# Patient Record
Sex: MALE | Race: BLACK OR AFRICAN AMERICAN | Employment: FULL TIME | URBAN - METROPOLITAN AREA
[De-identification: names, ages, dates, MRNs, and addresses within clinical notes are randomized per-mention and may not be internally consistent; named-entity substitution may affect disease eponyms.]

---

## 2017-06-06 ENCOUNTER — OFFICE VISIT (OUTPATIENT)
Dept: FAMILY MEDICINE CLINIC | Age: 52
End: 2017-06-06

## 2017-06-06 VITALS
WEIGHT: 315 LBS | RESPIRATION RATE: 20 BRPM | HEIGHT: 73 IN | OXYGEN SATURATION: 97 % | SYSTOLIC BLOOD PRESSURE: 142 MMHG | HEART RATE: 80 BPM | BODY MASS INDEX: 41.75 KG/M2 | TEMPERATURE: 96.9 F | DIASTOLIC BLOOD PRESSURE: 99 MMHG

## 2017-06-06 DIAGNOSIS — I10 ESSENTIAL HYPERTENSION: Primary | ICD-10-CM

## 2017-06-06 DIAGNOSIS — G47.33 OSA (OBSTRUCTIVE SLEEP APNEA): ICD-10-CM

## 2017-06-06 DIAGNOSIS — R97.20 ELEVATED PSA: ICD-10-CM

## 2017-06-06 PROBLEM — E78.00 PURE HYPERCHOLESTEROLEMIA: Status: ACTIVE | Noted: 2017-06-06

## 2017-06-06 PROBLEM — E66.01 MORBID OBESITY (HCC): Status: ACTIVE | Noted: 2017-06-06

## 2017-06-06 PROBLEM — Z99.89 OSA ON CPAP: Status: ACTIVE | Noted: 2017-06-06

## 2017-06-06 RX ORDER — ATORVASTATIN CALCIUM 40 MG/1
TABLET, FILM COATED ORAL DAILY
COMMUNITY
End: 2018-11-15

## 2017-06-06 RX ORDER — MELATONIN
DAILY
COMMUNITY
End: 2017-06-06 | Stop reason: CLARIF

## 2017-06-06 RX ORDER — LISINOPRIL 10 MG/1
TABLET ORAL DAILY
COMMUNITY
End: 2017-06-06 | Stop reason: CLARIF

## 2017-06-06 RX ORDER — ATORVASTATIN CALCIUM 20 MG/1
TABLET, FILM COATED ORAL DAILY
COMMUNITY
End: 2017-06-06 | Stop reason: CLARIF

## 2017-06-06 RX ORDER — HYDROCHLOROTHIAZIDE 25 MG/1
25 TABLET ORAL DAILY
COMMUNITY
End: 2017-06-06 | Stop reason: CLARIF

## 2017-06-06 RX ORDER — LISINOPRIL AND HYDROCHLOROTHIAZIDE 20; 25 MG/1; MG/1
TABLET ORAL DAILY
COMMUNITY
End: 2018-11-15 | Stop reason: SDUPTHER

## 2017-06-06 RX ORDER — MELATONIN
DAILY
COMMUNITY
End: 2018-11-15

## 2017-06-06 NOTE — MR AVS SNAPSHOT
Visit Information Date & Time Provider Department Dept. Phone Encounter #  
 6/6/2017 10:00 AM [facility], [address] [phone] [id number] Follow-up Instructions Return in about 3 months (around 9/6/2017) for hypertension. Upcoming Health Maintenance Date Due DTaP/Tdap/Td series (1 - Tdap) 1/8/1987 FOBT Q 1 YEAR AGE 50-75 1/8/2016 INFLUENZA AGE 9 TO ADULT 8/1/2017 Allergies as of 6/6/2017  Review Complete On: 6/6/2017 By: Maddie Johnson LPN Severity Noted Reaction Type Reactions Pcn [Penicillins] High 06/06/2017   Systemic Hives, Swelling Current Immunizations  Never Reviewed No immunizations on file. Not reviewed this visit You Were Diagnosed With   
  
 Codes Comments Essential hypertension    -  Primary ICD-10-CM: I10 
ICD-9-CM: 401.9 Elevated PSA     ICD-10-CM: R97.20 ICD-9-CM: 790.93   
 YVES (obstructive sleep apnea)     ICD-10-CM: G47.33 
ICD-9-CM: 327.23 Vitals BP Pulse Temp Resp Height(growth percentile) Weight(growth percentile) (!) 142/99 80 96.9 °F (36.1 °C) (Oral) 20 6' 1.43\" (1.865 m) 347 lb 12.8 oz (157.8 kg) SpO2 BMI Smoking Status 97% 45.36 kg/m2 Former Smoker Vitals History BMI and BSA Data Body Mass Index Body Surface Area  
 45.36 kg/m 2 2.86 m 2 Your Updated Medication List  
  
   
This list is accurate as of: 6/6/17 10:21 AM.  Always use your most recent med list.  
  
  
  
  
 atorvastatin 20 mg tablet Commonly known as:  LIPITOR Take  by mouth daily. hydroCHLOROthiazide 25 mg tablet Commonly known as:  HYDRODIURIL Take 25 mg by mouth daily. lisinopril 10 mg tablet Commonly known as:  Alberto Bold Take  by mouth daily. VITAMIN D3 1,000 unit tablet Generic drug:  cholecalciferol Take  by mouth daily. Follow-up Instructions Return in about 3 months (around 9/6/2017) for hypertension. Patient Instructions Please limit salt in your diet. 1800 mg daily. Avoid shaking salt on your food at the lunch or dinner table. Try alternative seasonings such as Mrs Grace Garcia and natural herbs. Please engage in regular exercise. Moderate intensity aerobic exercise  (60-70% maximal heart rate) for at least 80 minutes per week spread over a minimum of 3 days. Do not take more than 2 days off from exercising. Activities such as brisk walking, jogging, bicycling and swimming. DASH Diet: Care Instructions Your Care Instructions The DASH diet is an eating plan that can help lower your blood pressure. DASH stands for Dietary Approaches to Stop Hypertension. Hypertension is high blood pressure. The DASH diet focuses on eating foods that are high in calcium, potassium, and magnesium. These nutrients can lower blood pressure. The foods that are highest in these nutrients are fruits, vegetables, low-fat dairy products, nuts, seeds, and legumes. But taking calcium, potassium, and magnesium supplements instead of eating foods that are high in those nutrients does not have the same effect. The DASH diet also includes whole grains, fish, and poultry. The DASH diet is one of several lifestyle changes your doctor may recommend to lower your high blood pressure. Your doctor may also want you to decrease the amount of sodium in your diet. Lowering sodium while following the DASH diet can lower blood pressure even further than just the DASH diet alone. Follow-up care is a key part of your treatment and safety. Be sure to make and go to all appointments, and call your doctor if you are having problems. It's also a good idea to know your test results and keep a list of the medicines you take. How can you care for yourself at home? Following the DASH diet · Eat 4 to 5 servings of fruit each day.  A serving is 1 medium-sized piece of fruit, ½ cup chopped or canned fruit, 1/4 cup dried fruit, or 4 ounces (½ cup) of fruit juice. Choose fruit more often than fruit juice. · Eat 4 to 5 servings of vegetables each day. A serving is 1 cup of lettuce or raw leafy vegetables, ½ cup of chopped or cooked vegetables, or 4 ounces (½ cup) of vegetable juice. Choose vegetables more often than vegetable juice. · Get 2 to 3 servings of low-fat and fat-free dairy each day. A serving is 8 ounces of milk, 1 cup of yogurt, or 1 ½ ounces of cheese. · Eat 6 to 8 servings of grains each day. A serving is 1 slice of bread, 1 ounce of dry cereal, or ½ cup of cooked rice, pasta, or cooked cereal. Try to choose whole-grain products as much as possible. · Limit lean meat, poultry, and fish to 2 servings each day. A serving is 3 ounces, about the size of a deck of cards. · Eat 4 to 5 servings of nuts, seeds, and legumes (cooked dried beans, lentils, and split peas) each week. A serving is 1/3 cup of nuts, 2 tablespoons of seeds, or ½ cup of cooked beans or peas. · Limit fats and oils to 2 to 3 servings each day. A serving is 1 teaspoon of vegetable oil or 2 tablespoons of salad dressing. · Limit sweets and added sugars to 5 servings or less a week. A serving is 1 tablespoon jelly or jam, ½ cup sorbet, or 1 cup of lemonade. · Eat less than 2,300 milligrams (mg) of sodium a day. If you limit your sodium to 1,500 mg a day, you can lower your blood pressure even more. Tips for success · Start small. Do not try to make dramatic changes to your diet all at once. You might feel that you are missing out on your favorite foods and then be more likely to not follow the plan. Make small changes, and stick with them. Once those changes become habit, add a few more changes. · Try some of the following: ¨ Make it a goal to eat a fruit or vegetable at every meal and at snacks. This will make it easy to get the recommended amount of fruits and vegetables each day. ¨ Try yogurt topped with fruit and nuts for a snack or healthy dessert. ¨ Add lettuce, tomato, cucumber, and onion to sandwiches. ¨ Combine a ready-made pizza crust with low-fat mozzarella cheese and lots of vegetable toppings. Try using tomatoes, squash, spinach, broccoli, carrots, cauliflower, and onions. ¨ Have a variety of cut-up vegetables with a low-fat dip as an appetizer instead of chips and dip. ¨ Sprinkle sunflower seeds or chopped almonds over salads. Or try adding chopped walnuts or almonds to cooked vegetables. ¨ Try some vegetarian meals using beans and peas. Add garbanzo or kidney beans to salads. Make burritos and tacos with mashed luevano beans or black beans. Where can you learn more? Go to http://angelyyetuyomaira.info/. Enter N666 in the search box to learn more about \"DASH Diet: Care Instructions. \" Current as of: March 23, 2016 Content Version: 11.2 © 7502-4252 [employer]. Care instructions adapted under license by [employer] (which disclaims liability or warranty for this information). If you have questions about a medical condition or this instruction, always ask your healthcare professional. [address] any warranty or liability for your use of this information. Introducing [facility]! Ayo Stuart introduces Hyp[employer]arks patient portal. Now you can access parts of your medical record, email your doctor's office, and request medication refills online. 1. In your internet browser, go to [email or web address] [email or web address] 2. Click on the First Time User? Click Here link in the Sign In box. You will see the New Member Sign Up page. 3. Enter your Hyp[employer]arks Access Code exactly as it appears below. You will not need to use this code after youve completed the sign-up process. If you do not sign up before the expiration date, you must request a new code.  
 
· [employer]arks Access Code: [email or web address] 
 Expires: 9/4/2017 10:21 AM 
 
4. Enter the last four digits of your Social Security Number (xxxx) and Date of Birth (mm/dd/yyyy) as indicated and click Submit. You will be taken to the next sign-up page. 5. Create a Tabber ID. This will be your Tabber login ID and cannot be changed, so think of one that is secure and easy to remember. 6. Create a Tabber password. You can change your password at any time. 7. Enter your Password Reset Question and Answer. This can be used at a later time if you forget your password. 8. Enter your e-mail address. You will receive e-mail notification when new information is available in [address] 9. Click Sign Up. You can now view and download portions of your medical record. 10. Click the Download Summary menu link to download a portable copy of your medical information. If you have questions, please visit the Frequently Asked Questions section of the Tabber website. Remember, Tabber is NOT to be used for urgent needs. For medical emergencies, dial 911. Now available from your iPhone and Android! Please provide this summary of care documentation to your next provider. Your primary care clinician is listed as [place]. If you have any questions after today's visit, please call [phone].

## 2017-06-06 NOTE — PROGRESS NOTES
Abi Nugent is a 47 y.o. male in today to establish care and referral to urology. Learning assessment completed; primary language is [place].

## 2017-06-06 NOTE — PATIENT INSTRUCTIONS
Please limit salt in your diet. 1800 mg daily. Avoid shaking salt on your food at the lunch or dinner table. Try alternative seasonings such as Mrs Dotty Castro and natural herbs. Please engage in regular exercise. Moderate intensity aerobic exercise  (60-70% maximal heart rate) for at least 80 minutes per week spread over a minimum of 3 days. Do not take more than 2 days off from exercising. Activities such as brisk walking, jogging, bicycling and swimming. DASH Diet: Care Instructions  Your Care Instructions  The DASH diet is an eating plan that can help lower your blood pressure. DASH stands for Dietary Approaches to Stop Hypertension. Hypertension is high blood pressure. The DASH diet focuses on eating foods that are high in calcium, potassium, and magnesium. These nutrients can lower blood pressure. The foods that are highest in these nutrients are fruits, vegetables, low-fat dairy products, nuts, seeds, and legumes. But taking calcium, potassium, and magnesium supplements instead of eating foods that are high in those nutrients does not have the same effect. The DASH diet also includes whole grains, fish, and poultry. The DASH diet is one of several lifestyle changes your doctor may recommend to lower your high blood pressure. Your doctor may also want you to decrease the amount of sodium in your diet. Lowering sodium while following the DASH diet can lower blood pressure even further than just the DASH diet alone. Follow-up care is a key part of your treatment and safety. Be sure to make and go to all appointments, and call your doctor if you are having problems. It's also a good idea to know your test results and keep a list of the medicines you take. How can you care for yourself at home? Following the DASH diet  · Eat 4 to 5 servings of fruit each day. A serving is 1 medium-sized piece of fruit, ½ cup chopped or canned fruit, 1/4 cup dried fruit, or 4 ounces (½ cup) of fruit juice.  Choose fruit more often than fruit juice. · Eat 4 to 5 servings of vegetables each day. A serving is 1 cup of lettuce or raw leafy vegetables, ½ cup of chopped or cooked vegetables, or 4 ounces (½ cup) of vegetable juice. Choose vegetables more often than vegetable juice. · Get 2 to 3 servings of low-fat and fat-free dairy each day. A serving is 8 ounces of milk, 1 cup of yogurt, or 1 ½ ounces of cheese. · Eat 6 to 8 servings of grains each day. A serving is 1 slice of bread, 1 ounce of dry cereal, or ½ cup of cooked rice, pasta, or cooked cereal. Try to choose whole-grain products as much as possible. · Limit lean meat, poultry, and fish to 2 servings each day. A serving is 3 ounces, about the size of a deck of cards. · Eat 4 to 5 servings of nuts, seeds, and legumes (cooked dried beans, lentils, and split peas) each week. A serving is 1/3 cup of nuts, 2 tablespoons of seeds, or ½ cup of cooked beans or peas. · Limit fats and oils to 2 to 3 servings each day. A serving is 1 teaspoon of vegetable oil or 2 tablespoons of salad dressing. · Limit sweets and added sugars to 5 servings or less a week. A serving is 1 tablespoon jelly or jam, ½ cup sorbet, or 1 cup of lemonade. · Eat less than 2,300 milligrams (mg) of sodium a day. If you limit your sodium to 1,500 mg a day, you can lower your blood pressure even more. Tips for success  · Start small. Do not try to make dramatic changes to your diet all at once. You might feel that you are missing out on your favorite foods and then be more likely to not follow the plan. Make small changes, and stick with them. Once those changes become habit, add a few more changes. · Try some of the following:  ¨ Make it a goal to eat a fruit or vegetable at every meal and at snacks. This will make it easy to get the recommended amount of fruits and vegetables each day. ¨ Try yogurt topped with fruit and nuts for a snack or healthy dessert.   ¨ Add lettuce, tomato, cucumber, and onion to sandwiches. ¨ Combine a ready-made pizza crust with low-fat mozzarella cheese and lots of vegetable toppings. Try using tomatoes, squash, spinach, broccoli, carrots, cauliflower, and onions. ¨ Have a variety of cut-up vegetables with a low-fat dip as an appetizer instead of chips and dip. ¨ Sprinkle sunflower seeds or chopped almonds over salads. Or try adding chopped walnuts or almonds to cooked vegetables. ¨ Try some vegetarian meals using beans and peas. Add garbanzo or kidney beans to salads. Make burritos and tacos with mashed luevano beans or black beans. Where can you learn more? Go to http://angely-yomaira.info/. Enter T723 in the search box to learn more about \"DASH Diet: Care Instructions. \"  Current as of: March 23, 2016  Content Version: 11.2  © 3475-4337 [employer], [employer]. Care instructions adapted under license by [employer] (which disclaims liability or warranty for this information). If you have questions about a medical condition or this instruction, always ask your healthcare professional. [place] [address] any warranty or liability for your use of this information.

## 2017-06-06 NOTE — PROGRESS NOTES
Establish Care and Referral Request (urology)        HPI: Jerson Olsen is a 47 y.o. male BLACK OR   Here to establish care. Hx of HTN, Hyperlipidemia, YVES. He reports he has hx of elevated PSA x 2 readings thru the [facility]. Quit smoking 3 months ago. Smoked for 10 years starting at age 26. Quit for 5 years then restarted. Total number of years approximately 28 years. Smoked about a Pack per day. Hx of YVES, HTN, and Hyperlipidemia diagnosed in approximately 1990. Past Medical History:   Diagnosis Date    Childhood asthma     CPAP (continuous positive airway pressure) dependence     HTN (hypertension)     Hypercholesteremia     Vitamin D deficiency        Current Outpatient Prescriptions   Medication Sig    atorvastatin (LIPITOR) 40 mg tablet Take  by mouth daily.  lisinopril-hydroCHLOROthiazide (PRINZIDE, ZESTORETIC) 20-25 mg per tablet Take  by mouth daily.  cholecalciferol (VITAMIN D3) 1,000 unit tablet Take  by mouth daily. No current facility-administered medications for this visit. Allergies   Allergen Reactions    Pcn [Penicillins] Hives and Swelling       Past Surgical History:   Procedure Laterality Date    HX COLONOSCOPY  04/2017       Family History   Problem Relation Age of Onset    Arthritis-osteo Mother     Hypertension Mother     Lung Disease Father     Alcohol abuse Brother     Prostate Cancer Brother     Cancer Maternal Grandfather     Lung Disease Maternal Grandfather        Social History     Social History    Marital status:      Spouse name: N/A    Number of children: N/A    Years of education: N/A     Occupational History    Not on file.      Social History Main Topics    Smoking status: Former Smoker     Types: Cigarettes     Quit date: 3/14/2017    Smokeless tobacco: Never Used    Alcohol use 1.2 oz/week     2 Shots of liquor per week    Drug use: No    Sexual activity: Not on file     Other Topics Concern    Not on file     Social History Narrative    No narrative on file       Gen- No weight loss, No Malaise, No fatigue  Eyes- No dipoplia, blurry vision  CVS- No Chest pain, no palpitations, no edema  Resp- No Cough, SOB, ROBB, Wheezing  Neuro- No headaches  Skin- No easy bruising, No rashes      Visit Vitals    BP (!) 142/99    Pulse 80    Temp 96.9 °F (36.1 °C) (Oral)    Resp 20    Ht 6' 1.43\" (1.865 m)    Wt 347 lb 12.8 oz (157.8 kg)    SpO2 97%    BMI 45.36 kg/m2       GEN- NAD, AAOx3  NECK- No carotid bruits. CVS- RRR, +S1, +S2, No Murmurs, No JVD  PULM- CTABL, No W/R/R  EXT- No edema  NEURO-Normal Gait  PSYCH- Euthymic, normal afffect  Declined prostate exam as he reports he had one in March that was normal.      Assesment:  1. Essential hypertension  Poorly controlled. He has been off of his HCTZ for a few days as he reports it causes him cramps    2. Elevated PSA  Will have patient provide us recent PSA values and referral be sent as needed. 3. YVES (obstructive sleep apnea)  Has not had evaluation in several years. would like to wait on referral as of now until workup on elevated PSA is complete. He is compliant with his CPAP. Denies any daytime fatigue/somolence        I have discussed the diagnosis with the patient and the intended management  The patient has received an after-visit summary and questions were answered concerning future plans. I have discussed medication usage, side effects and warnings with the patient as well. I have reviewed the plan of care with the patient, accepted their input and they are in agreement with the treatment goals. Follow-up Disposition:  Return in about 3 months (around 9/6/2017) for hypertension.       Aaron Pedraza MD                .

## 2017-07-07 PROBLEM — Z80.42 FAMILY HISTORY OF PROSTATE CANCER: Status: ACTIVE | Noted: 2017-07-07

## 2017-07-07 PROBLEM — R97.20 ELEVATED PSA: Status: ACTIVE | Noted: 2017-07-07

## 2018-11-15 ENCOUNTER — HOSPITAL ENCOUNTER (OUTPATIENT)
Dept: LAB | Age: 53
Discharge: HOME | End: 2018-11-15
Payer: COMMERCIAL

## 2018-11-15 ENCOUNTER — OFFICE VISIT (OUTPATIENT)
Dept: FAMILY MEDICINE CLINIC | Age: 53
End: 2018-11-15

## 2018-11-15 VITALS
OXYGEN SATURATION: 97 % | HEIGHT: 73 IN | TEMPERATURE: 99.1 F | DIASTOLIC BLOOD PRESSURE: 111 MMHG | RESPIRATION RATE: 18 BRPM | WEIGHT: 315 LBS | HEART RATE: 71 BPM | SYSTOLIC BLOOD PRESSURE: 155 MMHG | BODY MASS INDEX: 41.75 KG/M2

## 2018-11-15 DIAGNOSIS — C61 PROSTATE CANCER (HCC): ICD-10-CM

## 2018-11-15 DIAGNOSIS — I10 ESSENTIAL HYPERTENSION: Primary | ICD-10-CM

## 2018-11-15 DIAGNOSIS — Z23 ENCOUNTER FOR IMMUNIZATION: ICD-10-CM

## 2018-11-15 DIAGNOSIS — I10 ESSENTIAL HYPERTENSION: ICD-10-CM

## 2018-11-15 DIAGNOSIS — J31.0 CHRONIC RHINITIS: ICD-10-CM

## 2018-11-15 LAB
ALBUMIN SERPL-MCNC: 4.3 G/DL (ref 3.4–5)
ALBUMIN/GLOB SERPL: 1.2 {RATIO} (ref 0.8–1.7)
ALP SERPL-CCNC: 68 U/L (ref 45–117)
ALT SERPL-CCNC: 32 U/L (ref 16–61)
ANION GAP SERPL CALC-SCNC: 10 MMOL/L (ref 3–18)
AST SERPL-CCNC: 15 U/L (ref 15–37)
BILIRUB SERPL-MCNC: 0.6 MG/DL (ref 0.2–1)
BUN SERPL-MCNC: 19 MG/DL (ref 7–18)
BUN/CREAT SERPL: 20 (ref 12–20)
CALCIUM SERPL-MCNC: 9.6 MG/DL (ref 8.5–10.1)
CHLORIDE SERPL-SCNC: 105 MMOL/L (ref 100–108)
CHOLEST SERPL-MCNC: 226 MG/DL
CO2 SERPL-SCNC: 27 MMOL/L (ref 21–32)
CREAT SERPL-MCNC: 0.97 MG/DL (ref 0.6–1.3)
GLOBULIN SER CALC-MCNC: 3.5 G/DL (ref 2–4)
GLUCOSE SERPL-MCNC: 94 MG/DL (ref 74–99)
HDLC SERPL-MCNC: 43 MG/DL (ref 40–60)
HDLC SERPL: 5.3 {RATIO} (ref 0–5)
LDLC SERPL CALC-MCNC: 164.6 MG/DL (ref 0–100)
LIPID PROFILE,FLP: ABNORMAL
POTASSIUM SERPL-SCNC: 3.9 MMOL/L (ref 3.5–5.5)
PROT SERPL-MCNC: 7.8 G/DL (ref 6.4–8.2)
PSA SERPL-MCNC: 0.9 NG/ML (ref 0–4)
SODIUM SERPL-SCNC: 142 MMOL/L (ref 136–145)
TRIGL SERPL-MCNC: 92 MG/DL (ref ?–150)
VLDLC SERPL CALC-MCNC: 18.4 MG/DL

## 2018-11-15 PROCEDURE — 82570 ASSAY OF URINE CREATININE: CPT

## 2018-11-15 PROCEDURE — 80061 LIPID PANEL: CPT

## 2018-11-15 PROCEDURE — 80053 COMPREHEN METABOLIC PANEL: CPT

## 2018-11-15 PROCEDURE — 84153 ASSAY OF PSA TOTAL: CPT

## 2018-11-15 PROCEDURE — 82652 VIT D 1 25-DIHYDROXY: CPT

## 2018-11-15 RX ORDER — MONTELUKAST SODIUM 10 MG/1
10 TABLET ORAL DAILY
Qty: 30 TAB | Refills: 2 | Status: SHIPPED | OUTPATIENT
Start: 2018-11-15 | End: 2019-04-25 | Stop reason: SDUPTHER

## 2018-11-15 RX ORDER — LISINOPRIL AND HYDROCHLOROTHIAZIDE 20; 25 MG/1; MG/1
1 TABLET ORAL DAILY
Qty: 90 TAB | Refills: 1 | Status: SHIPPED | OUTPATIENT
Start: 2018-11-15 | End: 2019-08-29 | Stop reason: SDUPTHER

## 2018-11-15 RX ORDER — MOMETASONE FUROATE 50 UG/1
2 SPRAY, METERED NASAL DAILY
Qty: 1 CONTAINER | Refills: 1 | Status: SHIPPED | OUTPATIENT
Start: 2018-11-15 | End: 2019-01-16 | Stop reason: SDUPTHER

## 2018-11-15 RX ORDER — LISINOPRIL AND HYDROCHLOROTHIAZIDE 12.5; 2 MG/1; MG/1
TABLET ORAL DAILY
COMMUNITY
End: 2018-11-15 | Stop reason: DRUGHIGH

## 2018-11-15 RX ORDER — CODEINE PHOSPHATE AND GUAIFENESIN 10; 100 MG/5ML; MG/5ML
5 SOLUTION ORAL
COMMUNITY
End: 2018-12-04

## 2018-11-15 NOTE — PROGRESS NOTES
Follow-up HPI: Gely Melendez is a 47 y.o. male [address] here after prolonged absence to reestablish care. He has been going to the [facility] for care prior to today. He is grown frustrated with care that he has received there. Last I saw him subsequent to that he was diagnosed with prostate cancer. He has been seeing Dr. Satish Angelo a [facility] of [place]. He has undergone brachytherapy as well as external radiation beam therapy. And it sounds like he has been given Eligard injection 1 month ago by his urologist at the [facility]. He would like to reestablish with Dr. Satish Angelo. Reports chronic nasal congestion, clear runny nose, mild sinus pressure for the last several months since radiation therapy. He denies any fevers, purulent nasal drainage, sore throat. He never had this problem prior to radiation therapy. He has been using Flonase with some mild improvement. However his symptoms persist. 
He was off of his blood pressure medications for several weeks due to difficulty with getting this refilled by his [facility] PCP. He just restarted this yesterday after seeing another local physician. Unfortunately the dosing was then corrected is 20/12.51 in fact he has been on 20/25 of Prinzide. Past Medical History:  
Diagnosis Date  Childhood asthma  CPAP (continuous positive airway pressure) dependence  HTN (hypertension)  Hypercholesteremia  Prostate cancer ([facility] [address]  Vitamin D deficiency Current Outpatient Medications Medication Sig  AZITHROMYCIN PO Take  by mouth.  PREDNISONE PO Take  by mouth.  guaiFENesin-codeine (VIRTUSSIN AC) 100-10 mg/5 mL solution Take 5 mL by mouth three (3) times daily as needed for Cough.  lisinopril-hydroCHLOROthiazide (PRINZIDE, ZESTORETIC) 20-25 mg per tablet Take 1 Tab by mouth daily.   
 mometasone (NASONEX) 50 mcg/actuation nasal spray 2 Sprays by Both Nostrils route daily.  montelukast (SINGULAIR) 10 mg tablet Take 1 Tab by mouth daily.  traMADol (ULTRAM-ER) 100 mg tablet Take 100 mg by mouth daily.  atorvastatin (LIPITOR) 40 mg tablet Take  by mouth daily.  cholecalciferol (VITAMIN D3) 1,000 unit tablet Take  by mouth daily. No current facility-administered medications for this visit. Allergies Allergen Reactions  Pcn [Penicillins] Hives and Swelling Past Surgical History:  
Procedure Laterality Date  HX COLONOSCOPY  2017 Family History Problem Relation Age of Onset Hollie Arthritis-osteo Mother  Hypertension Mother  Lung Disease Father  Alcohol abuse Brother  Prostate Cancer Brother  Cancer Maternal Grandfather  Lung Disease Maternal Grandfather Social History Socioeconomic History  Marital status:  Spouse name: Not on file  Number of children: Not on file  Years of education: Not on file  Highest education level: Not on file Social Needs  Financial resource strain: Not on file  Food insecurity - worry: Not on file  Food insecurity - inability: Not on file  Transportation needs - medical: Not on file  Transportation needs - non-medical: Not on file Occupational History  Not on file Tobacco Use  Smoking status: Former Smoker Types: Cigarettes Last attempt to quit: 3/14/2017 Years since quittin.6  Smokeless tobacco: Never Used Substance and Sexual Activity  Alcohol use: Yes Alcohol/week: 1.2 oz Types: 2 Shots of liquor per week  Drug use: No  
 Sexual activity: Not on file Other Topics Concern  Not on file Social History Narrative  Not on file Gen- No weight loss, No Malaise, No fatigue Eyes- No dipoplia, blurry vision CVS- No Chest pain, no palpitations, no edema Resp- No Cough, SOB, ROBB, Wheezing Neuro- No headaches Skin- No easy bruising, No rashes Visit Vitals BP (!) 155/111 (BP 1 Location: Left arm, BP Patient Position: Sitting) Pulse 71 Temp 99.1 °F (37.3 °C) Resp 18 Ht 6' 1\" (1.854 m) Wt (!) 358 lb (162.4 kg) SpO2 97% BMI 47.23 kg/m² GEN- NAD, AAOx3, morbidly obese ENT-external canals and TMs normal bilaterally, nose markedly congested bilaterally with clear nasal drainage; throat with marked postnasal drip mild erythema otherwise normal; negative sinus tenderness CVS- RRR, +S1, +S2, No Murmurs, No JVD PULM- CTABL, No W/R/R 
EXT- No edema NEURO-Normal Gait PSYCH- Euthymic, normal afffect Assesment: 1. Essential hypertension Blood pressure is uncontrolled today. Patient has just restarted blood pressure medicines yesterday. I have refilled his medicine that is previous dosing of 20/25 mg of Prinzide once daily. Will also need updated labs in regards to renal function as well as lipid panel. 
- lisinopril-hydroCHLOROthiazide (PRINZIDE, ZESTORETIC) 20-25 mg per tablet; Take 1 Tab by mouth daily. Dispense: 90 Tab; Refill: 1 
- METABOLIC PANEL, COMPREHENSIVE; Future - LIPID PANEL; Future - MICROALBUMIN, UR, RAND W/ MICROALB/CREAT RATIO; Future - INFLUENZA VIRUS VAC QUAD,SPLIT,PRESV FREE SYRINGE IM 2. Prostate cancer ([facility] [address] I have obtained a PSA as well as a vitamin D per Dr. Ileana Barnes is last chart note which I reviewed. I have also referred him back to Dr. Ileana Barnes for continued management of prostate cancer. 
- PSA, DIAGNOSTIC (PROSTATE SPECIFIC AG); Future - VITAMIN D, 1, 25 DIHYDROXY; Future 
- REFERRAL TO UROLOGY 3. Chronic rhinitis For his chronic rhinitis I have provided him a prescription for Nasonex as well as started him on Singulair once daily. - mometasone (NASONEX) 50 mcg/actuation nasal spray; 2 Sprays by Both Nostrils route daily. Dispense: 1 Container; Refill: 1 
- montelukast (SINGULAIR) 10 mg tablet; Take 1 Tab by mouth daily. Dispense: 30 Tab; Refill: 2 Patient was given flu shot today. I have discussed the diagnosis with the patient and the intended management  The patient has received an after-visit summary and questions were answered concerning future plans. I have discussed medication usage, side effects and warnings with the patient as well. I have reviewed the plan of care with the patient, accepted their input and they are in agreement with the treatment goals. Follow-up Disposition: 
Return in about 3 months (around 2/15/2019) for Hypertension.  
 
 
Tammie Mcdaniel MD 
 
 
 
 
 
 
 
.

## 2018-11-15 NOTE — PATIENT INSTRUCTIONS
DASH Diet: Care Instructions Your Care Instructions The DASH diet is an eating plan that can help lower your blood pressure. DASH stands for Dietary Approaches to Stop Hypertension. Hypertension is high blood pressure. The DASH diet focuses on eating foods that are high in calcium, potassium, and magnesium. These nutrients can lower blood pressure. The foods that are highest in these nutrients are fruits, vegetables, low-fat dairy products, nuts, seeds, and legumes. But taking calcium, potassium, and magnesium supplements instead of eating foods that are high in those nutrients does not have the same effect. The DASH diet also includes whole grains, fish, and poultry. The DASH diet is one of several lifestyle changes your doctor may recommend to lower your high blood pressure. Your doctor may also want you to decrease the amount of sodium in your diet. Lowering sodium while following the DASH diet can lower blood pressure even further than just the DASH diet alone. Follow-up care is a key part of your treatment and safety. Be sure to make and go to all appointments, and call your doctor if you are having problems. It's also a good idea to know your test results and keep a list of the medicines you take. How can you care for yourself at home? Following the DASH diet · Eat 4 to 5 servings of fruit each day. A serving is 1 medium-sized piece of fruit, ½ cup chopped or canned fruit, 1/4 cup dried fruit, or 4 ounces (½ cup) of fruit juice. Choose fruit more often than fruit juice. · Eat 4 to 5 servings of vegetables each day. A serving is 1 cup of lettuce or raw leafy vegetables, ½ cup of chopped or cooked vegetables, or 4 ounces (½ cup) of vegetable juice. Choose vegetables more often than vegetable juice. · Get 2 to 3 servings of low-fat and fat-free dairy each day. A serving is 8 ounces of milk, 1 cup of yogurt, or 1 ½ ounces of cheese. · Eat 6 to 8 servings of grains each day. A serving is 1 slice of bread, 1 ounce of dry cereal, or ½ cup of cooked rice, pasta, or cooked cereal. Try to choose whole-grain products as much as possible. · Limit lean meat, poultry, and fish to 2 servings each day. A serving is 3 ounces, about the size of a deck of cards. · Eat 4 to 5 servings of nuts, seeds, and legumes (cooked dried beans, lentils, and split peas) each week. A serving is 1/3 cup of nuts, 2 tablespoons of seeds, or ½ cup of cooked beans or peas. · Limit fats and oils to 2 to 3 servings each day. A serving is 1 teaspoon of vegetable oil or 2 tablespoons of salad dressing. · Limit sweets and added sugars to 5 servings or less a week. A serving is 1 tablespoon jelly or jam, ½ cup sorbet, or 1 cup of lemonade. · Eat less than 2,300 milligrams (mg) of sodium a day. If you limit your sodium to 1,500 mg a day, you can lower your blood pressure even more. Tips for success · Start small. Do not try to make dramatic changes to your diet all at once. You might feel that you are missing out on your favorite foods and then be more likely to not follow the plan. Make small changes, and stick with them. Once those changes become habit, add a few more changes. · Try some of the following: ? Make it a goal to eat a fruit or vegetable at every meal and at snacks. This will make it easy to get the recommended amount of fruits and vegetables each day. ? Try yogurt topped with fruit and nuts for a snack or healthy dessert. ? Add lettuce, tomato, cucumber, and onion to sandwiches. ? Combine a ready-made pizza crust with low-fat mozzarella cheese and lots of vegetable toppings. Try using tomatoes, squash, spinach, broccoli, carrots, cauliflower, and onions. ? Have a variety of cut-up vegetables with a low-fat dip as an appetizer instead of chips and dip. ? Sprinkle sunflower seeds or chopped almonds over salads.  Or try adding chopped walnuts or almonds to cooked vegetables. ? Try some vegetarian meals using beans and peas. Add garbanzo or kidney beans to salads. Make burritos and tacos with mashed luevano beans or black beans. Where can you learn more? Go to http://angely-yomaira.info/. Enter D845 in the search box to learn more about \"DASH Diet: Care Instructions. \" Current as of: December 6, 2017 Content Version: 11.8 © 8140-6872 [employer]. Care instructions adapted under license by [employer] (which disclaims liability or warranty for this information). If you have questions about a medical condition or this instruction, always ask your healthcare professional. [address] any warranty or liability for your use of this information.

## 2018-11-16 LAB
CREAT UR-MCNC: 136 MG/DL (ref 30–125)
MICROALBUMIN UR-MCNC: 0.62 MG/DL (ref 0–3)
MICROALBUMIN/CREAT UR-RTO: 5 MG/G (ref 0–30)

## 2018-11-19 LAB — 1,25(OH)2D3 SERPL-MCNC: 53.8 PG/ML (ref 19.9–79.3)

## 2018-11-19 NOTE — PROGRESS NOTES
Spoke with patient and made him aware of results. He states he will call back to make an appointment to discuss his cholesterol. Labs faxed to Dr. Niki Lacey at [facility].

## 2018-12-04 ENCOUNTER — OFFICE VISIT (OUTPATIENT)
Dept: FAMILY MEDICINE CLINIC | Age: 53
End: 2018-12-04

## 2018-12-04 VITALS
OXYGEN SATURATION: 95 % | BODY MASS INDEX: 41.75 KG/M2 | TEMPERATURE: 98.2 F | SYSTOLIC BLOOD PRESSURE: 134 MMHG | RESPIRATION RATE: 18 BRPM | HEIGHT: 73 IN | DIASTOLIC BLOOD PRESSURE: 94 MMHG | HEART RATE: 98 BPM | WEIGHT: 315 LBS

## 2018-12-04 DIAGNOSIS — E78.00 PURE HYPERCHOLESTEROLEMIA: ICD-10-CM

## 2018-12-04 DIAGNOSIS — I10 ESSENTIAL HYPERTENSION: ICD-10-CM

## 2018-12-04 DIAGNOSIS — J20.9 ACUTE BRONCHITIS, UNSPECIFIED ORGANISM: Primary | ICD-10-CM

## 2018-12-04 RX ORDER — ATORVASTATIN CALCIUM 10 MG/1
10 TABLET, FILM COATED ORAL DAILY
Qty: 90 TAB | Refills: 1 | Status: SHIPPED | OUTPATIENT
Start: 2018-12-04 | End: 2019-02-06 | Stop reason: SINTOL

## 2018-12-04 RX ORDER — AZITHROMYCIN 250 MG/1
TABLET, FILM COATED ORAL
Qty: 6 TAB | Refills: 0 | Status: SHIPPED | OUTPATIENT
Start: 2018-12-04 | End: 2018-12-08

## 2018-12-04 RX ORDER — CODEINE PHOSPHATE AND GUAIFENESIN 10; 100 MG/5ML; MG/5ML
5 SOLUTION ORAL
Qty: 90 ML | Refills: 0 | Status: SHIPPED | OUTPATIENT
Start: 2018-12-04 | End: 2019-02-06 | Stop reason: ALTCHOICE

## 2018-12-04 RX ORDER — PREDNISONE 20 MG/1
40 TABLET ORAL
Qty: 10 TAB | Refills: 0 | Status: SHIPPED | OUTPATIENT
Start: 2018-12-04 | End: 2019-02-06 | Stop reason: ALTCHOICE

## 2018-12-04 RX ORDER — ALBUTEROL SULFATE 2.5 MG/.5ML
SOLUTION RESPIRATORY (INHALATION) ONCE
COMMUNITY
End: 2019-01-11 | Stop reason: SDUPTHER

## 2018-12-04 NOTE — PATIENT INSTRUCTIONS
Bronchitis: Care Instructions  Your Care Instructions    Bronchitis is inflammation of the bronchial tubes, which carry air to the lungs. The tubes swell and produce mucus, or phlegm. The mucus and inflamed bronchial tubes make you cough. You may have trouble breathing. Most cases of bronchitis are caused by viruses like those that cause colds. Antibiotics usually do not help and they may be harmful. Bronchitis usually develops rapidly and lasts about 2 to 3 weeks in otherwise healthy people. Follow-up care is a key part of your treatment and safety. Be sure to make and go to all appointments, and call your doctor if you are having problems. It's also a good idea to know your test results and keep a list of the medicines you take. How can you care for yourself at home? · Take all medicines exactly as prescribed. Call your doctor if you think you are having a problem with your medicine. · Get some extra rest.  · Take an over-the-counter pain medicine, such as acetaminophen (Tylenol), ibuprofen (Advil, Motrin), or naproxen (Aleve) to reduce fever and relieve body aches. Read and follow all instructions on the label. · Do not take two or more pain medicines at the same time unless the doctor told you to. Many pain medicines have acetaminophen, which is Tylenol. Too much acetaminophen (Tylenol) can be harmful. · Take an over-the-counter cough medicine that contains dextromethorphan to help quiet a dry, hacking cough so that you can sleep. Avoid cough medicines that have more than one active ingredient. Read and follow all instructions on the label. · Breathe moist air from a humidifier, hot shower, or sink filled with hot water. The heat and moisture will thin mucus so you can cough it out. · Do not smoke. Smoking can make bronchitis worse. If you need help quitting, talk to your doctor about stop-smoking programs and medicines. These can increase your chances of quitting for good.   When should you call for help? Call 911 anytime you think you may need emergency care. For example, call if:    · You have severe trouble breathing.    Call your doctor now or seek immediate medical care if:    · You have new or worse trouble breathing.     · You cough up dark brown or bloody mucus (sputum).     · You have a new or higher fever.     · You have a new rash.    Watch closely for changes in your health, and be sure to contact your doctor if:    · You cough more deeply or more often, especially if you notice more mucus or a change in the color of your mucus.     · You are not getting better as expected. Where can you learn more? Go to http://angely-yomaira.info/. Enter H333 in the search box to learn more about \"Bronchitis: Care Instructions. \"  Current as of: December 6, 2017  Content Version: 11.8  © 7200-1316 [employer]. Care instructions adapted under license by [employer] (which disclaims liability or warranty for this information). If you have questions about a medical condition or this instruction, always ask your healthcare professional. [address] any warranty or liability for your use of this information. High Cholesterol: Care Instructions  Your Care Instructions    Cholesterol is a type of fat in your blood. It is needed for many body functions, such as making new cells. Cholesterol is made by your body. It also comes from food you eat. High cholesterol means that you have too much of the fat in your blood. This raises your risk of a heart attack and stroke. LDL and HDL are part of your total cholesterol. LDL is the \"bad\" cholesterol. High LDL can raise your risk for heart disease, heart attack, and stroke. HDL is the \"good\" cholesterol. It helps clear bad cholesterol from the body. High HDL is linked with a lower risk of heart disease, heart attack, and stroke.   Your cholesterol levels help your doctor find out your risk for having a heart attack or stroke. You and your doctor can talk about whether you need to lower your risk and what treatment is best for you. A heart-healthy lifestyle along with medicines can help lower your cholesterol and your risk. The way you choose to lower your risk will depend on how high your risk is for heart attack and stroke. It will also depend on how you feel about taking medicines. Follow-up care is a key part of your treatment and safety. Be sure to make and go to all appointments, and call your doctor if you are having problems. It's also a good idea to know your test results and keep a list of the medicines you take. How can you care for yourself at home? · Eat a variety of foods every day. Good choices include fruits, vegetables, whole grains (like oatmeal), dried beans and peas, nuts and seeds, soy products (like tofu), and fat-free or low-fat dairy products. · Replace butter, margarine, and hydrogenated or partially hydrogenated oils with olive and canola oils. (Canola oil margarine without trans fat is fine.)  · Replace red meat with fish, poultry, and soy protein (like tofu). · Limit processed and packaged foods like chips, crackers, and cookies. · Bake, broil, or steam foods. Don't gibson them. · Be physically active. Get at least 30 minutes of exercise on most days of the week. Walking is a good choice. You also may want to do other activities, such as running, swimming, cycling, or playing tennis or team sports. · Stay at a healthy weight or lose weight by making the changes in eating and physical activity listed above. Losing just a small amount of weight, even 5 to 10 pounds, can reduce your risk for having a heart attack or stroke. · Do not smoke. When should you call for help? Watch closely for changes in your health, and be sure to contact your doctor if:    · You need help making lifestyle changes.     · You have questions about your medicine. Where can you learn more?   Go to http://angely-yomaira.info/. Enter X307 in the search box to learn more about \"High Cholesterol: Care Instructions. \"  Current as of: December 6, 2017  Content Version: 11.8  © 6122-2849 [employer]. Care instructions adapted under license by [employer] (which disclaims liability or warranty for this information). If you have questions about a medical condition or this instruction, always ask your healthcare professional. [place] [address] any warranty or liability for your use of this information.

## 2018-12-04 NOTE — LETTER
NOTIFICATION OF RETURN TO WORK / SCHOOL 
 
12/4/2018 10:17 AM 
 
Mr. Kamala Mcknight [address] [facility] [address] [facility] [id number] [facility] To Whom It May Concern: 
 
Kamala Mcknight is under the care of Frankie Keith. Seen today in office on 12/4/2016 He will be able to return to work/school on 12/6/2018 with no restrictions. If there are questions or concerns please have the patient contact our office.  
 
Sincerely, 
 
 
Rajendra Veras MD

## 2018-12-04 NOTE — PROGRESS NOTES
Nasal Congestion        SUBJECTIVE:   Cedric Baker is a 47 y.o. male who complains of dry cough and SOB, chest tightness for 15+ days. He denies a history of fevers and sputum production and does a history of asthma as a child. Has had recurrent shortness of breath/ chest congestion since radiation treatment for Prostate CA. Had Chest x-ray 1 month ago which he reports was negative for pneumonia. This was done thru [place] and not available for review. Patient formerly smoked cigarettes. Last visit we had completed lipid panel as noted below. Demonstrated uncontrolled LDL. He had been on lipitor in the past. It did cause muscle aches for him particularly of the left arm. He reduced his dosing to every other day and this did improve his muscle aches. Past Medical History:   Diagnosis Date    Childhood asthma     CPAP (continuous positive airway pressure) dependence     HTN (hypertension)     Hypercholesteremia     Prostate cancer (HCC)     Vitamin D deficiency        Current Outpatient Medications   Medication Sig    albuterol sulfate (PROVENTIL;VENTOLIN) 2.5 mg/0.5 mL nebu nebulizer solution by Nebulization route once.  lisinopril-hydroCHLOROthiazide (PRINZIDE, ZESTORETIC) 20-25 mg per tablet Take 1 Tab by mouth daily.  mometasone (NASONEX) 50 mcg/actuation nasal spray 2 Sprays by Both Nostrils route daily.  montelukast (SINGULAIR) 10 mg tablet Take 1 Tab by mouth daily.  guaiFENesin-codeine (VIRTUSSIN AC) 100-10 mg/5 mL solution Take 5 mL by mouth three (3) times daily as needed for Cough.  traMADol (ULTRAM-ER) 100 mg tablet Take 100 mg by mouth daily. No current facility-administered medications for this visit.         Allergies   Allergen Reactions    Pcn [Penicillins] Hives and Swelling       Past Surgical History:   Procedure Laterality Date    HX COLONOSCOPY  04/2017       Family History   Problem Relation Age of Onset    Arthritis-osteo Mother     Hypertension Mother    [facility] Lung Disease Father     Alcohol abuse Brother     Prostate Cancer Brother     Cancer Maternal Grandfather     Lung Disease Maternal Grandfather        Social History     Socioeconomic History    Marital status:      Spouse name: Not on file    Number of children: Not on file    Years of education: Not on file    Highest education level: Not on file   Social Needs    Financial resource strain: Not on file    Food insecurity - worry: Not on file    Food insecurity - inability: Not on file   [place]) needs - medical: Not on file   [place] [employer] needs - non-medical: Not on file   Occupational History    Not on file   Tobacco Use    Smoking status: Former Smoker     Types: Cigarettes     Last attempt to quit: 3/14/2017     Years since quittin.7    Smokeless tobacco: Never Used   Substance and Sexual Activity    Alcohol use: Yes     Alcohol/week: 1.2 oz     Types: 2 Shots of liquor per week    Drug use: No    Sexual activity: Not on file   Other Topics Concern    Not on file   Social History Narrative    Not on file       Constitutional: Negative for fever and chills. Respiratory: Positive for cough, congestion. Negative for shortness of breath and wheezing. Cardiovascular: Negative for chest pain. Genitourinary: Negative for dysuria, urgency and frequency. Musculoskeletal: Negative for joint pain. Skin: Negative for rash. The following portions of the patient's history were reviewed and updated as appropriate: allergies, current medications, past medical history, past surgical history and problem list.    OBJECTIVE:  Visit Vitals  BP (!) 137/96 (BP 1 Location: Left arm, BP Patient Position: Sitting)   Pulse 98   Temp 98.2 °F (36.8 °C) (Oral)   Resp 18   Ht 6' 1\" (1.854 m)   Wt (!) 358 lb 12.8 oz (162.8 kg)   SpO2 95%   BMI 47.34 kg/m²     He appears well, vital signs are as noted. Ears normal.  Throat and pharynx normal.  Neck supple. No adenopathy in the neck.   The chest is + for diffuses wheezes; negative rhonchi or rales. Heart RRR, nL S1, S2, no murmurs    Lab Results from today's visit:  No results found for this or any previous visit (from the past 4 hour(s)). Results for orders placed or performed during the hospital encounter of 03/04/09   METABOLIC PANEL, COMPREHENSIVE   Result Value Ref Range    Sodium 142 136 - 145 mmol/L    Potassium 3.9 3.5 - 5.5 mmol/L    Chloride 105 100 - 108 mmol/L    CO2 27 21 - 32 mmol/L    Anion gap 10 3.0 - 18 mmol/L    Glucose 94 74 - 99 mg/dL    BUN 19 (H) 7.0 - 18 MG/DL    Creatinine 0.97 0.6 - 1.3 MG/DL    BUN/Creatinine ratio 20 12 - 20      GFR est AA >60 >60 ml/min/1.73m2    GFR est non-AA >60 >60 ml/min/1.73m2    Calcium 9.6 8.5 - 10.1 MG/DL    Bilirubin, total 0.6 0.2 - 1.0 MG/DL    ALT (SGPT) 32 16 - 61 U/L    AST (SGOT) 15 15 - 37 U/L    Alk. phosphatase 68 45 - 117 U/L    Protein, total 7.8 6.4 - 8.2 g/dL    Albumin 4.3 3.4 - 5.0 g/dL    Globulin 3.5 2.0 - 4.0 g/dL    A-G Ratio 1.2 0.8 - 1.7     LIPID PANEL   Result Value Ref Range    LIPID PROFILE          Cholesterol, total 226 (H) <200 MG/DL    Triglyceride 92 <150 MG/DL    HDL Cholesterol 43 40 - 60 MG/DL    LDL, calculated 164.6 (H) 0 - 100 MG/DL    VLDL, calculated 18.4 MG/DL    CHOL/HDL Ratio 5.3 (H) 0 - 5.0     MICROALBUMIN, UR, RAND W/ MICROALB/CREAT RATIO   Result Value Ref Range    Microalbumin,urine random 0.62 0 - 3.0 MG/DL    Creatinine, urine 136.00 (H) 30 - 125 mg/dL    Microalbumin/Creat ratio (mg/g creat) 5 0 - 30 mg/g   PSA, DIAGNOSTIC (PROSTATE SPECIFIC AG)   Result Value Ref Range    Prostate Specific Ag 0.9 0.0 - 4.0 ng/mL   VITAMIN D, 1, 25 DIHYDROXY   Result Value Ref Range    Calcitriol (Vit D 1, 25 di-OH) 53.8 19.9 - 79.3 pg/mL         ASSESSMENT:     ICD-10-CM ICD-9-CM    1. Acute bronchitis, unspecified organism J20.9 466.0    2. Pure hypercholesterolemia E78.00 272.0 atorvastatin (LIPITOR) 10 mg tablet   3.  Essential hypertension I10 401.9 PLAN:  1. Patient has wheezing on exam today. Symptoms of been present for 2+ weeks. He denies any fevers. Will start a Z-Balta as well as prednisone 40 mg daily times 5 days. May need updated imaging if symptoms do not improve. Patient is aware of this. 2.  We will start patient on Lipitor 10 mg nightly. He will let me know if he does develop new myalgias. 3.  Blood pressure slightly elevated today. Question secondary to acute illness. Reports he has been checking his blood pressures at home and they have been typically in the 130s over 80s. Lately diastolic has been in the low 90s he has been feeling more ill. Follow-up Disposition:  Return in about 2 months (around 2/4/2019) for essential hypertension, hyperlipidemia; sooner if needed.         Paras Alexandre MD

## 2018-12-04 NOTE — PROGRESS NOTES
Harsh Arzola is a 47 y.o. male here today with c/o cough producing greenish phlegm. He has shortness of breath from the cough and congestion. He denies any fever.

## 2019-01-07 ENCOUNTER — TELEPHONE (OUTPATIENT)
Dept: FAMILY MEDICINE CLINIC | Age: 54
End: 2019-01-07

## 2019-01-07 NOTE — TELEPHONE ENCOUNTER
Patient is needing to have CT of both lung. Patient went to [facility] and they found a spot on the his lung. Wife would like to have a CT to make sure that it has not gotten worse.      Please send order to [facility]

## 2019-01-08 NOTE — TELEPHONE ENCOUNTER
Returned call to pt to find out approx when he was seen and had the xray so that we can try to get records.  Left voicemail for her to return call

## 2019-01-08 NOTE — TELEPHONE ENCOUNTER
The only x-ray I see from November was normal.  Will need records from [place]. Do we know when this x-ray was done?

## 2019-01-08 NOTE — TELEPHONE ENCOUNTER
Pt's wife returned call, she states pt was seen at [facility] on [facility] sometime between Thanksgiving and Christmas. Pt has been getting an abx and steroid every few weeks for persistent cough since Aug. He finished radiation treatment in July. Advised her that we will request the records from [facility] and let her know if/when Dr. Brenda Nam places an order for a CT scan. She voiced understanding.

## 2019-01-11 ENCOUNTER — OFFICE VISIT (OUTPATIENT)
Dept: FAMILY MEDICINE CLINIC | Age: 54
End: 2019-01-11

## 2019-01-11 VITALS
BODY MASS INDEX: 41.75 KG/M2 | RESPIRATION RATE: 18 BRPM | HEART RATE: 79 BPM | SYSTOLIC BLOOD PRESSURE: 128 MMHG | TEMPERATURE: 98.2 F | DIASTOLIC BLOOD PRESSURE: 90 MMHG | WEIGHT: 315 LBS | HEIGHT: 73 IN | OXYGEN SATURATION: 95 %

## 2019-01-11 DIAGNOSIS — R06.02 SOB (SHORTNESS OF BREATH): Primary | ICD-10-CM

## 2019-01-11 RX ORDER — ALBUTEROL SULFATE 2.5 MG/.5ML
2.5 SOLUTION RESPIRATORY (INHALATION)
Qty: 10 ML | Refills: 2 | Status: SHIPPED | OUTPATIENT
Start: 2019-01-11

## 2019-01-11 NOTE — PROGRESS NOTES
Todd Burns is a 49 y.o. male here for follow up on breathing difficulty.  He states he has been doing breathing treatments but there has been no improvement

## 2019-01-11 NOTE — PROGRESS NOTES
Breathing Problem        HPI: Lamberto Hayden is a 49 y.o. male [address] here with complaints of shortness of breath with minimal activity. This has been present for approximately last 2 months. He did use albuterol nebulized treatment earlier today. He reports he does have occasional cough but does not  bring up any sputum. I had seen him back on December 4 and diagnosed with acute bronchitis for which he was treated. His shortness of breath continues despite this. He is requiring use of albuterol nebulized to help with the wheezing and and perceived shortness of breath. He recently saw his urologist who did a PET scan for him for staging for upcoming prostate surgery secondary to prostatic cancer. He reports he was told he had a lesion on the left side of his lungs on the PET scan but he is not sure exactly what they said it was. He does have history o cigarette smoking. He smoked for 10+ years  but has quit 2 years ago. Past Medical History:   Diagnosis Date    Childhood asthma     CPAP (continuous positive airway pressure) dependence     HTN (hypertension)     Hypercholesteremia     Prostate cancer (HCC)     Vitamin D deficiency        Current Outpatient Medications   Medication Sig    albuterol sulfate (PROVENTIL;VENTOLIN) 2.5 mg/0.5 mL nebu nebulizer solution 0.5 mL by Nebulization route every four (4) hours as needed for Wheezing.  atorvastatin (LIPITOR) 10 mg tablet Take 1 Tab by mouth daily.  lisinopril-hydroCHLOROthiazide (PRINZIDE, ZESTORETIC) 20-25 mg per tablet Take 1 Tab by mouth daily.  mometasone (NASONEX) 50 mcg/actuation nasal spray 2 Sprays by Both Nostrils route daily.  predniSONE (DELTASONE) 20 mg tablet Take 40 mg by mouth daily (with breakfast).  guaiFENesin-codeine (ROBITUSSIN AC) 100-10 mg/5 mL solution Take 5 mL by mouth three (3) times daily as needed for Cough. Max Daily Amount: 15 mL.     montelukast (SINGULAIR) 10 mg tablet Take 1 Tab by mouth daily. No current facility-administered medications for this visit. Allergies   Allergen Reactions    Pcn [Penicillins] Hives and Swelling       Past Surgical History:   Procedure Laterality Date    HX COLONOSCOPY  2017       Family History   Problem Relation Age of Onset    Arthritis-osteo Mother     Hypertension Mother     Lung Disease Father     Alcohol abuse Brother     Prostate Cancer Brother     Cancer Maternal Grandfather     Lung Disease Maternal Grandfather        Social History     Socioeconomic History    Marital status:      Spouse name: Not on file    Number of children: Not on file    Years of education: Not on file    Highest education level: Not on file   Social Needs    Financial resource strain: Not on file    Food insecurity - worry: Not on file    Food insecurity - inability: Not on file   Intoo needs - medical: Not on file   Intoo needs - non-medical: Not on file   Occupational History    Not on file   Tobacco Use    Smoking status: Former Smoker     Types: Cigarettes     Last attempt to quit: 3/14/2017     Years since quittin.8    Smokeless tobacco: Never Used   Substance and Sexual Activity    Alcohol use: Yes     Alcohol/week: 1.2 oz     Types: 2 Shots of liquor per week    Drug use: No    Sexual activity: Not on file   Other Topics Concern    Not on file   Social History Narrative    Not on file       Review of Systems   Constitutional: Negative for chills and fever. Respiratory: Positive for cough, shortness of breath and wheezing. Negative for sputum production. Cardiovascular: Negative for chest pain and palpitations. Gastrointestinal: Negative for abdominal pain.          Visit Vitals  /90 (BP 1 Location: Left arm, BP Patient Position: Sitting)   Pulse 79   Temp 98.2 °F (36.8 °C) (Oral)   Resp 18   Ht 6' 1\" (1.854 m)   Wt (!) 363 lb 12.8 oz (165 kg)   SpO2 95%   BMI 48.00 kg/m² PE      Assesment:    ICD-10-CM ICD-9-CM    1. SOB (shortness of breath) R06.02 786.05 albuterol sulfate (PROVENTIL;VENTOLIN) 2.5 mg/0.5 mL nebu nebulizer solution      CT CHEST WO CONT      PULMONARY FUNCTION TEST     I would be reviewing the records of Mr. Memo Alexandre is brought for me from the [place]. I do think it worthwhile due to this prolonged period of shortness of breath that we obtain a chest CT and due to his smoking history pulmonary function test.  He will follow-up after these examinations. I have refilled albuterol for him today. I have discussed the diagnosis with the patient and the intended management  The patient has received an after-visit summary and questions were answered concerning future plans. I have discussed medication usage, side effects and warnings with the patient as well. I have reviewed the plan of care with the patient, accepted their input and they are in agreement with the treatment goals. Follow-up Disposition:  Return for pending tests.       Lukas Gamez MD                .

## 2019-01-11 NOTE — PATIENT INSTRUCTIONS
Shortness of Breath: Care Instructions  Your Care Instructions  Shortness of breath has many causes. Sometimes conditions such as anxiety can lead to shortness of breath. Some people get mild shortness of breath when they exercise. Trouble breathing also can be a symptom of a serious problem, such as asthma, lung disease, emphysema, heart problems, and pneumonia. If your shortness of breath continues, you may need tests and treatment. Watch for any changes in your breathing and other symptoms. Follow-up care is a key part of your treatment and safety. Be sure to make and go to all appointments, and call your doctor if you are having problems. It's also a good idea to know your test results and keep a list of the medicines you take. How can you care for yourself at home? · Do not smoke or allow others to smoke around you. If you need help quitting, talk to your doctor about stop-smoking programs and medicines. These can increase your chances of quitting for good. · Get plenty of rest and sleep. · Take your medicines exactly as prescribed. Call your doctor if you think you are having a problem with your medicine. · Find healthy ways to deal with stress. ? Exercise daily. ? Get plenty of sleep. ? Eat regularly and well. When should you call for help? Call 911 anytime you think you may need emergency care. For example, call if:    · You have severe shortness of breath.     · You have symptoms of a heart attack. These may include:  ? Chest pain or pressure, or a strange feeling in the chest.  ? Sweating. ? Shortness of breath. ? Nausea or vomiting. ? Pain, pressure, or a strange feeling in the back, neck, jaw, or upper belly or in one or both shoulders or arms. ? Lightheadedness or sudden weakness. ? A fast or irregular heartbeat. After you call 911, the  may tell you to chew 1 adult-strength or 2 to 4 low-dose aspirin. Wait for an ambulance.  Do not try to drive yourself.    Call your doctor now or seek immediate medical care if:    · Your shortness of breath gets worse or you start to wheeze. Wheezing is a high-pitched sound when you breathe.     · You wake up at night out of breath or have to prop your head up on several pillows to breathe.     · You are short of breath after only light activity or while at rest.    Watch closely for changes in your health, and be sure to contact your doctor if:    · You do not get better over the next 1 to 2 days. Where can you learn more? Go to http://angely-yomaira.info/. Enter S780 in the search box to learn more about \"Shortness of Breath: Care Instructions. \"  Current as of: December 6, 2017  Content Version: 11.8  © 4061-5084 [employer]. Care instructions adapted under license by [employer] (which disclaims liability or warranty for this information). If you have questions about a medical condition or this instruction, always ask your healthcare professional. [address] any warranty or liability for your use of this information.

## 2019-01-22 ENCOUNTER — TELEPHONE (OUTPATIENT)
Dept: FAMILY MEDICINE CLINIC | Age: 54
End: 2019-01-22

## 2019-01-22 NOTE — TELEPHONE ENCOUNTER
Can you please see what solution he is talking about? Also did he get his CT completed?     Thanks,  Toño Mo MD

## 2019-01-22 NOTE — TELEPHONE ENCOUNTER
Pharmacy called stating that the patient prefers the premixed albuterol solution. Patient is requesting a new script.

## 2019-01-22 NOTE — TELEPHONE ENCOUNTER
Called pharmacy to find out what they mean by this, pharmacist stated that the order is for oral concentrate but the pt wants the vials. Per Dr. Chacho Cook, gave verbal order to change to vials, 20 vials, with no refills.

## 2019-01-29 ENCOUNTER — DOCUMENTATION ONLY (OUTPATIENT)
Dept: FAMILY MEDICINE CLINIC | Age: 54
End: 2019-01-29

## 2019-01-29 DIAGNOSIS — R06.02 SOB (SHORTNESS OF BREATH): ICD-10-CM

## 2019-01-29 NOTE — PROGRESS NOTES
Spoke to pt, gave him results and he voiced understanding.  He will schedule his follow up very soon

## 2019-01-29 NOTE — PROGRESS NOTES
Review of Mr. Mckay Lav is recent pulmonary function test and chest CT. His pulmonary function tests demonstrate severe chronic obstructive pulmonary disease as well as a restrictive pattern. Likely secondary to his obesity. His chest CT was read as normal by the radiologist.  We will have him follow-up to discuss the findings.     Amalia Madrid MD  1/29/2019, 1:38 PM  Pina MAHER

## 2019-02-06 ENCOUNTER — OFFICE VISIT (OUTPATIENT)
Dept: FAMILY MEDICINE CLINIC | Age: 54
End: 2019-02-06

## 2019-02-06 VITALS
OXYGEN SATURATION: 96 % | DIASTOLIC BLOOD PRESSURE: 89 MMHG | SYSTOLIC BLOOD PRESSURE: 136 MMHG | RESPIRATION RATE: 18 BRPM | HEIGHT: 73 IN | HEART RATE: 82 BPM | WEIGHT: 315 LBS | BODY MASS INDEX: 41.75 KG/M2 | TEMPERATURE: 98 F

## 2019-02-06 DIAGNOSIS — J44.9 COPD (CHRONIC OBSTRUCTIVE PULMONARY DISEASE) WITH CHRONIC BRONCHITIS (HCC): Primary | ICD-10-CM

## 2019-02-06 DIAGNOSIS — E78.00 PURE HYPERCHOLESTEROLEMIA: ICD-10-CM

## 2019-02-06 DIAGNOSIS — I10 ESSENTIAL HYPERTENSION: ICD-10-CM

## 2019-02-06 DIAGNOSIS — E66.01 MORBID OBESITY (HCC): ICD-10-CM

## 2019-02-06 RX ORDER — PEN NEEDLE, DIABETIC 30 GX3/16"
NEEDLE, DISPOSABLE MISCELLANEOUS DAILY
Qty: 100 PEN NEEDLE | Refills: 3 | Status: SHIPPED | OUTPATIENT
Start: 2019-02-06 | End: 2021-02-17

## 2019-02-06 RX ORDER — PRAVASTATIN SODIUM 10 MG/1
10 TABLET ORAL
Qty: 90 TAB | Refills: 1 | Status: SHIPPED | OUTPATIENT
Start: 2019-02-06 | End: 2019-08-29 | Stop reason: SDUPTHER

## 2019-02-06 RX ORDER — BUDESONIDE AND FORMOTEROL FUMARATE DIHYDRATE 160; 4.5 UG/1; UG/1
2 AEROSOL RESPIRATORY (INHALATION) 2 TIMES DAILY
Qty: 1 INHALER | Refills: 11 | Status: SHIPPED | OUTPATIENT
Start: 2019-02-06

## 2019-02-06 RX ORDER — ALBUTEROL SULFATE 90 UG/1
2 AEROSOL, METERED RESPIRATORY (INHALATION)
Qty: 1 INHALER | Refills: 5 | Status: SHIPPED | OUTPATIENT
Start: 2019-02-06

## 2019-02-06 NOTE — PROGRESS NOTES
Kayla Hale is a 49 y.o. male here for HTN and cholesterol follow up and discuss results of pulmonary function test and MRI. His cholesterol med is causing leg cramping.

## 2019-02-06 NOTE — PROGRESS NOTES
Hypertension and Cholesterol Problem        HPI: Frandy Roblero is a 49 y.o. male [address] here in follow-up for his hypertension as well as his recent lung CT and pulmonary function test.  He is using his Prinzide without side effect. He is hoping to be engage in regular exercise. He recently underwent  Pulmonary function tests and lung CT secondary to shortness of breath as well as possible left lung lesion is reported to Mr. Li Caldwell by his urologist.  His pulmonary function test demonstrated severe obstructive disease as well as restrictive disease likely secondary to his morbid obesity. He he has had recurrent bronchitis episodes in the last 6 months. He has history of asthma as a child but went to adolescence and most of his adult life without symptoms. He is having to use his albuterol inhaler regularly with his shortness of breath. He does have dyspnea on exertion. He does have positive sputum production he reports.     He also reports lipitor 10mg is causing him recurrent calf cramping    Past Medical History:   Diagnosis Date    Childhood asthma     CPAP (continuous positive airway pressure) dependence     HTN (hypertension)     Hypercholesteremia     Prostate cancer (HCC)     Vitamin D deficiency            Allergies   Allergen Reactions    Pcn [Penicillins] Hives and Swelling       Past Surgical History:   Procedure Laterality Date    HX COLONOSCOPY  04/2017       Family History   Problem Relation Age of Onset    Arthritis-osteo Mother     Hypertension Mother     Lung Disease Father     Alcohol abuse Brother     Prostate Cancer Brother     Cancer Maternal Grandfather     Lung Disease Maternal Grandfather        Social History     Socioeconomic History    Marital status:      Spouse name: Not on file    Number of children: Not on file    Years of education: Not on file    Highest education level: Not on file   Social Needs    Financial resource strain: Not on file    Food insecurity - worry: Not on file    Food insecurity - inability: Not on file    Transportation needs - medical: Not on file   Vobile needs - non-medical: Not on file   Occupational History    Not on file   Tobacco Use    Smoking status: Former Smoker     Types: Cigarettes     Last attempt to quit: 3/14/2017     Years since quittin.9    Smokeless tobacco: Never Used   Substance and Sexual Activity    Alcohol use: Yes     Alcohol/week: 1.2 oz     Types: 2 Shots of liquor per week    Drug use: No    Sexual activity: Not on file   Other Topics Concern    Not on file   Social History Narrative    Not on file       Gen- No weight loss, No Malaise, No fatigue  Eyes- No dipoplia, blurry vision  CVS- No Chest pain, no palpitations, no edema  Neuro- No headaches  Skin- No easy bruising, No rashes    Visit Vitals  /89 (BP 1 Location: Left arm, BP Patient Position: Sitting)   Pulse 82   Temp 98 °F (36.7 °C) (Oral)   Resp 18   Ht 6' 1\" (1.854 m)   Wt (!) 365 lb (165.6 kg)   SpO2 96%   BMI 48.16 kg/m²       GEN- NAD, AAOx3  CVS- RRR, +S1, +S2, No Murmurs, No JVD  PULM-; no rales/rhonchi; positive diffuse wheezes  EXT- No edema  NEURO-Normal Gait  PSYCH- Euthymic, normal afffect        Assesment:  1. COPD (chronic obstructive pulmonary disease) with chronic bronchitis ([facility] [address]  New diagnosis. Start symbicort 2 puffs BID. Refill albuterol inhaler  - budesonide-formoterol (SYMBICORT) 160-4.5 mcg/actuation HFAA; Take 2 Puffs by inhalation two (2) times a day. Dispense: 1 Inhaler; Refill: 11  - albuterol (PROAIR HFA) 90 mcg/actuation inhaler; Take 2 Puffs by inhalation every four (4) hours as needed for Wheezing or Shortness of Breath. Dispense: 1 Inhaler; Refill: 5    2. Essential hypertension  Controlled. Continue use of prinzide    3.  Morbid obesity ([address]  Start [place] for weight loss  - liraglutide (SAXENDA) 3 mg/0.5 mL (18 mg/3 mL) pen; Week 1: 0.6mg SC daily Week 2: 1.2mg SC daily Week 3: 1.8 mg SC daily Week 4: 2.4mg SC daily Week 5 and beyond: 3.0mg SC daily  Dispense: 15 mL; Refill: 1  - Insulin Needles, Disposable, 31 gauge x 5/16\" ndle; by Not Applicable route daily. Use 1 pen needle daily with saxenda pen  Dispense: 100 Pen Needle; Refill: 3    4. Pure hypercholesterolemia  Experiencing muscle cramping with use of lipitor. Start pravachol.   - pravastatin (PRAVACHOL) 10 mg tablet; Take 1 Tab by mouth nightly. Dispense: 90 Tab; Refill: 1      I have discussed the diagnosis with the patient and the intended management  The patient has received an after-visit summary and questions were answered concerning future plans. I have discussed medication usage, side effects and warnings with the patient as well. I have reviewed the plan of care with the patient, accepted their input and they are in agreement with the treatment goals. Follow-up Disposition:  Return in about 1 month (around 3/6/2019) for COPD, weight. Garrison Steele MD                .    Discussed the patient's BMI with him. The BMI follow up plan is as follows:     dietary management education, guidance, and counseling  encourage exercise  monitor weight  prescribed dietary intake    An After Visit Summary was printed and given to the patient.

## 2019-02-06 NOTE — PATIENT INSTRUCTIONS
Chronic Obstructive Pulmonary Disease (COPD): Care Instructions  Your Care Instructions    Chronic obstructive pulmonary disease (COPD) is a general term for a group of lung diseases, including emphysema and chronic bronchitis. People with COPD have decreased airflow in and out of the lungs, which makes it hard to breathe. The airways also can get clogged with thick mucus. Cigarette smoking is a major cause of COPD. Although there is no cure for COPD, you can slow its progress. Following your treatment plan and taking care of yourself can help you feel better and live longer. Follow-up care is a key part of your treatment and safety. Be sure to make and go to all appointments, and call your doctor if you are having problems. It's also a good idea to know your test results and keep a list of the medicines you take. How can you care for yourself at home?   Staying healthy    · Do not smoke. This is the most important step you can take to prevent more damage to your lungs. If you need help quitting, talk to your doctor about stop-smoking programs and medicines. These can increase your chances of quitting for good.     · Avoid colds and flu. Get a pneumococcal vaccine shot. If you have had one before, ask your doctor whether you need a second dose. Get the flu vaccine every fall. If you must be around people with colds or the flu, wash your hands often.     · Avoid secondhand smoke, air pollution, and high altitudes. Also avoid cold, dry air and hot, humid air. Stay at home with your windows closed when air pollution is bad.    Medicines and oxygen therapy    · Take your medicines exactly as prescribed. Call your doctor if you think you are having a problem with your medicine.     · You may be taking medicines such as:  ? Bronchodilators. These help open your airways and make breathing easier. Bronchodilators are either short-acting (work for 6 to 9 hours) or long-acting (work for 24 hours).  You inhale most bronchodilators, so they start to act quickly. Always carry your quick-relief inhaler with you in case you need it while you are away from home. ? Corticosteroids (prednisone, budesonide). These reduce airway inflammation. They come in pill or inhaled form. You must take these medicines every day for them to work well.     · A spacer may help you get more inhaled medicine to your lungs. Ask your doctor or pharmacist if a spacer is right for you. If it is, ask how to use it properly.     · Do not take any vitamins, over-the-counter medicine, or herbal products without talking to your doctor first.     · If your doctor prescribed antibiotics, take them as directed. Do not stop taking them just because you feel better. You need to take the full course of antibiotics.     · Oxygen therapy boosts the amount of oxygen in your blood and helps you breathe easier. Use the flow rate your doctor has recommended, and do not change it without talking to your doctor first.   Activity    · Get regular exercise. Walking is an easy way to get exercise. Start out slowly, and walk a little more each day.     · Pay attention to your breathing. You are exercising too hard if you cannot talk while you are exercising.     · Take short rest breaks when doing household chores and other activities.     · Learn breathing methods--such as breathing through pursed lips--to help you become less short of breath.     · If your doctor has not set you up with a pulmonary rehabilitation program, talk to him or her about whether rehab is right for you. Rehab includes exercise programs, education about your disease and how to manage it, help with diet and other changes, and emotional support. Diet    · Eat regular, healthy meals. Use bronchodilators about 1 hour before you eat to make it easier to eat. Eat several small meals instead of three large ones.  Drink beverages at the end of the meal. Avoid foods that are hard to chew.     · Eat foods that contain protein so that you do not lose muscle mass.     · Talk with your doctor if you gain too much weight or if you lose weight without trying.    Mental health    · Talk to your family, friends, or a therapist about your feelings. It is normal to feel frightened, angry, hopeless, helpless, and even guilty. Talking openly about bad feelings can help you cope. If these feelings last, talk to your doctor. When should you call for help? Call 911 anytime you think you may need emergency care. For example, call if:    · You have severe trouble breathing.    Call your doctor now or seek immediate medical care if:    · You have new or worse trouble breathing.     · You cough up blood.     · You have a fever.    Watch closely for changes in your health, and be sure to contact your doctor if:    · You cough more deeply or more often, especially if you notice more mucus or a change in the color of your mucus.     · You have new or worse swelling in your legs or belly.     · You are not getting better as expected. Where can you learn more? Go to http://angely-yomaira.info/. Jerman George in the search box to learn more about \"Chronic Obstructive Pulmonary Disease (COPD): Care Instructions. \"  Current as of: September 5, 2018  Content Version: 11.9  © 2562-9843 [employer]. Care instructions adapted under license by [employer] (which disclaims liability or warranty for this information). If you have questions about a medical condition or this instruction, always ask your healthcare professional. [place] [address] any warranty or liability for your use of this information. Body Mass Index: Care Instructions  Your Care Instructions    Body mass index (BMI) can help you see if your weight is raising your risk for health problems. It uses a formula to compare how much you weigh with how tall you are. · A BMI lower than 18.5 is considered underweight.   · A BMI between 18.5 and 24.9 is considered healthy. · A BMI between 25 and 29.9 is considered overweight. A BMI of 30 or higher is considered obese. If your BMI is in the normal range, it means that you have a lower risk for weight-related health problems. If your BMI is in the overweight or obese range, you may be at increased risk for weight-related health problems, such as high blood pressure, heart disease, stroke, arthritis or joint pain, and diabetes. If your BMI is in the underweight range, you may be at increased risk for health problems such as fatigue, lower protection (immunity) against illness, muscle loss, bone loss, hair loss, and hormone problems. BMI is just one measure of your risk for weight-related health problems. You may be at higher risk for health problems if you are not active, you eat an unhealthy diet, or you drink too much alcohol or use tobacco products. Follow-up care is a key part of your treatment and safety. Be sure to make and go to all appointments, and call your doctor if you are having problems. It's also a good idea to know your test results and keep a list of the medicines you take. How can you care for yourself at home? · Practice healthy eating habits. This includes eating plenty of fruits, vegetables, whole grains, lean protein, and low-fat dairy. · If your doctor recommends it, get more exercise. Walking is a good choice. Bit by bit, increase the amount you walk every day. Try for at least 30 minutes on most days of the week. · Do not smoke. Smoking can increase your risk for health problems. If you need help quitting, talk to your doctor about stop-smoking programs and medicines. These can increase your chances of quitting for good. · Limit alcohol to 2 drinks a day for men and 1 drink a day for women. Too much alcohol can cause health problems. If you have a BMI higher than 25  · Your doctor may do other tests to check your risk for weight-related health problems.  This may include measuring the distance around your waist. A waist measurement of more than 40 inches in men or 35 inches in women can increase the risk of weight-related health problems. · Talk with your doctor about steps you can take to stay healthy or improve your health. You may need to make lifestyle changes to lose weight and stay healthy, such as changing your diet and getting regular exercise. If you have a BMI lower than 18.5  · Your doctor may do other tests to check your risk for health problems. · Talk with your doctor about steps you can take to stay healthy or improve your health. You may need to make lifestyle changes to gain or maintain weight and stay healthy, such as getting more healthy foods in your diet and doing exercises to build muscle. Where can you learn more? Go to http://angely-yomaira.info/. Enter S176 in the search box to learn more about \"Body Mass Index: Care Instructions. \"  Current as of: June 25, 2018  Content Version: 11.9  © 7025-5889 [employer]. Care instructions adapted under license by [employer] (which disclaims liability or warranty for this information). If you have questions about a medical condition or this instruction, always ask your healthcare professional. [address] any warranty or liability for your use of this information.

## 2019-02-07 ENCOUNTER — TELEPHONE (OUTPATIENT)
Dept: FAMILY MEDICINE CLINIC | Age: 54
End: 2019-02-07

## 2019-02-07 NOTE — TELEPHONE ENCOUNTER
Patient called stating that he forgot to ask for cough syrup on his recent visit 2/6/19. Patient states that provider had previously prescribed syrup. Please advise.

## 2019-02-07 NOTE — TELEPHONE ENCOUNTER
A cough syrup of is appropriate if he has an acute infection. And this is the reason I had given it to him in the past for however he does not have one based on exam yesterday.

## 2019-04-25 DIAGNOSIS — J31.0 CHRONIC RHINITIS: ICD-10-CM

## 2019-04-25 RX ORDER — MONTELUKAST SODIUM 10 MG/1
10 TABLET ORAL DAILY
Qty: 30 TAB | Refills: 2 | Status: SHIPPED | OUTPATIENT
Start: 2019-04-25 | End: 2019-09-29 | Stop reason: SDUPTHER

## 2019-08-29 ENCOUNTER — OFFICE VISIT (OUTPATIENT)
Dept: FAMILY MEDICINE CLINIC | Age: 54
End: 2019-08-29

## 2019-08-29 ENCOUNTER — HOSPITAL ENCOUNTER (OUTPATIENT)
Dept: LAB | Age: 54
Discharge: HOME | End: 2019-08-29
Payer: COMMERCIAL

## 2019-08-29 VITALS
RESPIRATION RATE: 18 BRPM | OXYGEN SATURATION: 97 % | BODY MASS INDEX: 41.75 KG/M2 | HEART RATE: 73 BPM | TEMPERATURE: 97.9 F | HEIGHT: 73 IN | SYSTOLIC BLOOD PRESSURE: 143 MMHG | WEIGHT: 315 LBS | DIASTOLIC BLOOD PRESSURE: 101 MMHG

## 2019-08-29 DIAGNOSIS — E55.9 VITAMIN D INSUFFICIENCY: ICD-10-CM

## 2019-08-29 DIAGNOSIS — E78.00 PURE HYPERCHOLESTEROLEMIA: ICD-10-CM

## 2019-08-29 DIAGNOSIS — J44.9 COPD (CHRONIC OBSTRUCTIVE PULMONARY DISEASE) WITH CHRONIC BRONCHITIS (HCC): ICD-10-CM

## 2019-08-29 DIAGNOSIS — M25.561 RIGHT KNEE PAIN, UNSPECIFIED CHRONICITY: ICD-10-CM

## 2019-08-29 DIAGNOSIS — I10 ESSENTIAL HYPERTENSION: ICD-10-CM

## 2019-08-29 DIAGNOSIS — Z12.11 SCREENING FOR COLON CANCER: ICD-10-CM

## 2019-08-29 DIAGNOSIS — G47.33 OSA (OBSTRUCTIVE SLEEP APNEA): ICD-10-CM

## 2019-08-29 DIAGNOSIS — E66.01 MORBID OBESITY (HCC): ICD-10-CM

## 2019-08-29 DIAGNOSIS — I10 ESSENTIAL HYPERTENSION: Primary | ICD-10-CM

## 2019-08-29 LAB
ALBUMIN SERPL-MCNC: 4.2 G/DL (ref 3.4–5)
ALBUMIN/GLOB SERPL: 1.4 {RATIO} (ref 0.8–1.7)
ALP SERPL-CCNC: 91 U/L (ref 45–117)
ALT SERPL-CCNC: 28 U/L (ref 16–61)
ANION GAP SERPL CALC-SCNC: 5 MMOL/L (ref 3–18)
APPEARANCE UR: CLEAR
AST SERPL-CCNC: 12 U/L (ref 10–38)
BASOPHILS # BLD: 0.1 K/UL (ref 0–0.1)
BASOPHILS NFR BLD: 1 % (ref 0–2)
BILIRUB SERPL-MCNC: 0.3 MG/DL (ref 0.2–1)
BILIRUB UR QL: NEGATIVE
BUN SERPL-MCNC: 21 MG/DL (ref 7–18)
BUN/CREAT SERPL: 16 (ref 12–20)
CALCIUM SERPL-MCNC: 9.3 MG/DL (ref 8.5–10.1)
CHLORIDE SERPL-SCNC: 106 MMOL/L (ref 100–111)
CO2 SERPL-SCNC: 30 MMOL/L (ref 21–32)
COLOR UR: YELLOW
CREAT SERPL-MCNC: 1.3 MG/DL (ref 0.6–1.3)
DIFFERENTIAL METHOD BLD: ABNORMAL
EOSINOPHIL # BLD: 0.5 K/UL (ref 0–0.4)
EOSINOPHIL NFR BLD: 7 % (ref 0–5)
ERYTHROCYTE [DISTWIDTH] IN BLOOD BY AUTOMATED COUNT: 14.2 % (ref 11.6–14.5)
EST. AVERAGE GLUCOSE BLD GHB EST-MCNC: 117 MG/DL
GLOBULIN SER CALC-MCNC: 3.1 G/DL (ref 2–4)
GLUCOSE SERPL-MCNC: 87 MG/DL (ref 74–99)
GLUCOSE UR STRIP.AUTO-MCNC: NEGATIVE MG/DL
HBA1C MFR BLD: 5.7 % (ref 4.2–5.6)
HCT VFR BLD AUTO: 40.9 % (ref 36–48)
HGB BLD-MCNC: 14 G/DL (ref 13–16)
HGB UR QL STRIP: NEGATIVE
KETONES UR QL STRIP.AUTO: NEGATIVE MG/DL
LEUKOCYTE ESTERASE UR QL STRIP.AUTO: NEGATIVE
LYMPHOCYTES # BLD: 1.8 K/UL (ref 0.9–3.6)
LYMPHOCYTES NFR BLD: 24 % (ref 21–52)
MCH RBC QN AUTO: 28.3 PG (ref 24–34)
MCHC RBC AUTO-ENTMCNC: 34.2 G/DL (ref 31–37)
MCV RBC AUTO: 82.6 FL (ref 74–97)
MONOCYTES # BLD: 0.6 K/UL (ref 0.05–1.2)
MONOCYTES NFR BLD: 8 % (ref 3–10)
NEUTS SEG # BLD: 4.8 K/UL (ref 1.8–8)
NEUTS SEG NFR BLD: 60 % (ref 40–73)
NITRITE UR QL STRIP.AUTO: NEGATIVE
PH UR STRIP: 5.5 [PH] (ref 5–8)
PLATELET # BLD AUTO: 267 K/UL (ref 135–420)
PMV BLD AUTO: 10.3 FL (ref 9.2–11.8)
POTASSIUM SERPL-SCNC: 4.4 MMOL/L (ref 3.5–5.5)
PROT SERPL-MCNC: 7.3 G/DL (ref 6.4–8.2)
PROT UR STRIP-MCNC: NEGATIVE MG/DL
RBC # BLD AUTO: 4.95 M/UL (ref 4.7–5.5)
SODIUM SERPL-SCNC: 141 MMOL/L (ref 136–145)
SP GR UR REFRACTOMETRY: 1.02 (ref 1–1.03)
TSH SERPL DL<=0.05 MIU/L-ACNC: 1.35 UIU/ML (ref 0.36–3.74)
URATE SERPL-MCNC: 7 MG/DL (ref 2.6–7.2)
UROBILINOGEN UR QL STRIP.AUTO: 0.2 EU/DL (ref 0.2–1)
WBC # BLD AUTO: 7.8 K/UL (ref 4.6–13.2)

## 2019-08-29 PROCEDURE — 84550 ASSAY OF BLOOD/URIC ACID: CPT

## 2019-08-29 PROCEDURE — 80053 COMPREHEN METABOLIC PANEL: CPT

## 2019-08-29 PROCEDURE — 84439 ASSAY OF FREE THYROXINE: CPT

## 2019-08-29 PROCEDURE — 83036 HEMOGLOBIN GLYCOSYLATED A1C: CPT

## 2019-08-29 PROCEDURE — 81003 URINALYSIS AUTO W/O SCOPE: CPT

## 2019-08-29 PROCEDURE — 82306 VITAMIN D 25 HYDROXY: CPT

## 2019-08-29 PROCEDURE — 85025 COMPLETE CBC W/AUTO DIFF WBC: CPT

## 2019-08-29 PROCEDURE — 84443 ASSAY THYROID STIM HORMONE: CPT

## 2019-08-29 RX ORDER — NAPROXEN 500 MG/1
500 TABLET ORAL 2 TIMES DAILY WITH MEALS
Qty: 60 TAB | Refills: 0 | Status: SHIPPED | OUTPATIENT
Start: 2019-08-29 | End: 2021-02-17

## 2019-08-29 RX ORDER — PRAVASTATIN SODIUM 10 MG/1
10 TABLET ORAL
Qty: 90 TAB | Refills: 1 | Status: SHIPPED | OUTPATIENT
Start: 2019-08-29 | End: 2021-02-17

## 2019-08-29 RX ORDER — LISINOPRIL AND HYDROCHLOROTHIAZIDE 20; 25 MG/1; MG/1
1 TABLET ORAL DAILY
Qty: 90 TAB | Refills: 1 | Status: SHIPPED | OUTPATIENT
Start: 2019-08-29

## 2019-08-29 NOTE — PROGRESS NOTES
Hypertension Follow Up Progress Note      Today's Date:  2019   Patient's Name: Ai Pierson   Patient's :  1965     Subjective:     Ai Pierson is a 49 y.o. male who presents for follow up of hypertension. Ran out of medication ove two weeks ago. Diet and Lifestyle: generally follows a low fat low cholesterol diet, generally follows a low sodium diet, exercises regularly  Home BP Monitoring: is not measured at home  BP is elevated this morning in the office. Cardiovascular ROS: taking medications as instructed, no medication side effects noted, no TIA's, no chest pain on exertion, no dyspnea on exertion, no swelling of ankles. New concerns: C/o right pain. Has had this for the past months and has been taking OTC Motrin and Tylenol . Does not remember injuring it. States that tt swells and throbs at mostly at night; his wife gives him Tramadol and Motrin and he elevates the leg so he can be able to sleep. Obesity: Has been trying to loose weight. Has been exercising regularly and eating right but states that he is not loosing weight. Was prescribed Saxenda but he did not take it. Lost 10 pounds since last visit. Change in Patient Weight by Encounter (365 Day Lookback)    (2019) Office Visit  Last Recorded Weight: 161 kg (355 lb)             Percent Change: -2.78%   [2019 to 2019 (204 Days)]    (2019) Office Visit  Last Recorded Weight: 165.6 kg (365 lb)             Percent Change: +0.36%   [2019 to 2019 (26 Days)]    (2019) Office Visit  Last Recorded Weight: 165 kg (363 lb 12.8 oz)     Last colonoscopy was last year at the [place].       Review of Systems:   Constitutional: negative  Eyes: negative  Respiratory: negative  Cardiovascular: negative  Gastrointestinal: negative  Hematologic/lymphatic: negative  Musculoskeletal:Right knee pain  Neurological: negative  Behavioral/Psych: positive for obesity    BUN   Date Value Ref Range Status 11/15/2018 19 (H) 7.0 - 18 MG/DL Final     Creatinine   Date Value Ref Range Status   11/15/2018 0.97 0.6 - 1.3 MG/DL Final     GFR est AA   Date Value Ref Range Status   11/15/2018 >60 >60 ml/min/1.73m2 Final     BUN   Date Value Ref Range Status   11/15/2018 19 (H) 7.0 - 18 MG/DL Final     Creatinine   Date Value Ref Range Status   11/15/2018 0.97 0.6 - 1.3 MG/DL Final     GFR est AA   Date Value Ref Range Status   11/15/2018 >60 >60 ml/min/1.73m2 Final       Objective:     Visit Vitals  BP (!) 166/107 (BP 1 Location: Left arm, BP Patient Position: Sitting)   Pulse 78   Temp 97.9 °F (36.6 °C) (Oral)   Resp 18   Ht 6' 1\" (1.854 m)   Wt (!) 355 lb (161 kg)   SpO2 97%   BMI 46.84 kg/m²     Appearance: alert, well appearing, and in no distress and oriented to person, place, and time. Cardiovascular: Normal rate and regular rhythm, no gallop, no murmur., S1, S2 normal and no JVD   Pulmonary/Chest: Effort normal and breath sounds normal. No respiratory distress. No wheezes or rales  Lower Extremities (feet): warm, good capillary refill: mild swelling of right knee; no redness or tenderness with palpation. Lab review: orders written for new lab studies as appropriate; see orders. CVS exam BP noted to be well controlled today in office,    Assessment/Plan:   1. Essential hypertension  -BP elevated this visit. Will restart taking medication as prescribed; Start checking BP at home; Will return to the office   - lisinopril-hydroCHLOROthiazide (PRINZIDE, ZESTORETIC) 20-25 mg per tablet; Take 1 Tab by mouth daily. Dispense: 90 Tab; Refill: 1  - CBC WITH AUTOMATED DIFF; Future  - METABOLIC PANEL, COMPREHENSIVE; Future  - TSH 3RD GENERATION; Future  - T4, FREE; Future  - HEMOGLOBIN A1C WITH EAG; Future  - URINALYSIS W/ RFLX MICROSCOPIC; Future    2. Pure hypercholesterolemia  - pravastatin (PRAVACHOL) 10 mg tablet; Take 1 Tab by mouth nightly. Dispense: 90 Tab; Refill: 1    3.  Right knee pain, unspecified chronicity  - XR KNEE RT 3 V; Future  - VITAMIN D, 25 HYDROXY; Future  - URIC ACID; Future  - naproxen (NAPROSYN) 500 mg tablet; Take 1 Tab by mouth two (2) times daily (with meals). Dispense: 60 Tab; Refill: 0    4. Screening for colon cancer  - OCCULT BLOOD IMMUNOASSAY,DIAGNOSTIC; Future    5. COPD (chronic obstructive pulmonary disease) with chronic bronchitis ([facility] [address]    6. Morbid obesity ([facility] [address]  I have reviewed/discussed the above normal BMI with the patient. I have recommended the following interventions: dietary management education, guidance, and counseling . .    7. YVES (obstructive sleep apnea)  - REFERRAL TO PULMONARY DISEASE    Recommendations:  Limit sodium < 1500 mg/day  DASH diet  Wt reduction and maintenance  Exercise- 30 min 5 days/wk with intense 20 min 3 days/wk  Moderation of Alcohol intake: 1 serving/day, 5/wk -female, 2 servings/day  9/wk-male  Do not smoke  Avoid NSAIDs, pseudoephedrine, phenylephrine and herbal suppliments such as bitter orange, ginsing, augustina's wort, licorice, caffeine pills. Discussed with patient at length the need for blood pressure re-evaluation and management with primary care. Discussed the long term sequelae for elevated blood pressure including blindness, CVA, MI, and renal failure/ dialysis. Pt agrees to follow up as directed. Follow-up and Dispositions    · Return in about 3 months (around 11/29/2019), or if symptoms worsen or fail to improve.        CHARMAINE Carr   8/29/2019

## 2019-08-30 LAB
25(OH)D3 SERPL-MCNC: 23.6 NG/ML (ref 30–100)
T4 FREE SERPL-MCNC: 1.1 NG/DL (ref 0.7–1.5)

## 2019-09-04 DIAGNOSIS — J31.0 CHRONIC RHINITIS: ICD-10-CM

## 2019-09-04 RX ORDER — ERGOCALCIFEROL 1.25 MG/1
50000 CAPSULE ORAL
Qty: 12 CAP | Refills: 1 | Status: SHIPPED | OUTPATIENT
Start: 2019-09-04 | End: 2021-02-17

## 2019-09-04 RX ORDER — MOMETASONE FUROATE 50 UG/1
SPRAY, METERED NASAL
Qty: 1 CONTAINER | Refills: 2 | Status: SHIPPED | OUTPATIENT
Start: 2019-09-04

## 2019-09-29 DIAGNOSIS — J31.0 CHRONIC RHINITIS: ICD-10-CM

## 2019-09-30 RX ORDER — MONTELUKAST SODIUM 10 MG/1
TABLET ORAL
Qty: 30 TAB | Refills: 0 | Status: SHIPPED | OUTPATIENT
Start: 2019-09-30

## 2022-03-19 PROBLEM — E78.00 PURE HYPERCHOLESTEROLEMIA: Status: ACTIVE | Noted: 2017-06-06

## 2022-03-19 PROBLEM — R97.20 ELEVATED PSA: Status: ACTIVE | Noted: 2017-07-07

## 2022-03-19 PROBLEM — G47.33 OSA ON CPAP: Status: ACTIVE | Noted: 2017-06-06

## 2022-03-19 PROBLEM — I10 ESSENTIAL HYPERTENSION: Status: ACTIVE | Noted: 2017-06-06

## 2022-03-19 PROBLEM — Z99.89 OSA ON CPAP: Status: ACTIVE | Noted: 2017-06-06

## 2022-03-19 PROBLEM — E66.01 MORBID OBESITY (HCC): Status: ACTIVE | Noted: 2017-06-06

## 2022-03-19 PROBLEM — Z80.42 FAMILY HISTORY OF PROSTATE CANCER: Status: ACTIVE | Noted: 2017-07-07

## 2022-03-20 PROBLEM — J44.9 COPD (CHRONIC OBSTRUCTIVE PULMONARY DISEASE) WITH CHRONIC BRONCHITIS (HCC): Status: ACTIVE | Noted: 2019-02-06

## 2023-05-18 RX ORDER — BUDESONIDE AND FORMOTEROL FUMARATE DIHYDRATE 160; 4.5 UG/1; UG/1
2 AEROSOL RESPIRATORY (INHALATION) 2 TIMES DAILY
COMMUNITY
Start: 2019-02-06

## 2023-05-18 RX ORDER — LISINOPRIL AND HYDROCHLOROTHIAZIDE 25; 20 MG/1; MG/1
1 TABLET ORAL DAILY
COMMUNITY
Start: 2019-08-29

## 2023-05-18 RX ORDER — ALBUTEROL SULFATE 90 UG/1
2 AEROSOL, METERED RESPIRATORY (INHALATION) EVERY 4 HOURS PRN
COMMUNITY
Start: 2019-02-06

## 2023-05-18 RX ORDER — MOMETASONE FUROATE 50 UG/1
SPRAY, METERED NASAL
COMMUNITY
Start: 2019-09-04

## 2023-05-18 RX ORDER — MONTELUKAST SODIUM 10 MG/1
1 TABLET ORAL DAILY
COMMUNITY
Start: 2019-09-30